# Patient Record
Sex: MALE | Race: WHITE | NOT HISPANIC OR LATINO | Employment: FULL TIME | URBAN - METROPOLITAN AREA
[De-identification: names, ages, dates, MRNs, and addresses within clinical notes are randomized per-mention and may not be internally consistent; named-entity substitution may affect disease eponyms.]

---

## 2023-01-21 ENCOUNTER — HOSPITAL ENCOUNTER (EMERGENCY)
Facility: HOSPITAL | Age: 43
Discharge: HOME | End: 2023-01-21
Attending: EMERGENCY MEDICINE

## 2023-01-21 ENCOUNTER — APPOINTMENT (EMERGENCY)
Dept: CT IMAGING | Facility: HOSPITAL | Age: 43
End: 2023-01-21

## 2023-01-21 VITALS
TEMPERATURE: 99.2 F | SYSTOLIC BLOOD PRESSURE: 123 MMHG | HEART RATE: 81 BPM | OXYGEN SATURATION: 97 % | DIASTOLIC BLOOD PRESSURE: 57 MMHG | RESPIRATION RATE: 16 BRPM

## 2023-01-21 DIAGNOSIS — M54.16 LUMBAR RADICULOPATHY: ICD-10-CM

## 2023-01-21 DIAGNOSIS — M54.50 ACUTE LOW BACK PAIN: Primary | ICD-10-CM

## 2023-01-21 DIAGNOSIS — M51.26 PROTRUSION OF LUMBAR INTERVERTEBRAL DISC: ICD-10-CM

## 2023-01-21 LAB
ANION GAP SERPL CALCULATED.3IONS-SCNC: 6 MMOL/L (ref 4–13)
BACTERIA UR QL AUTO: ABNORMAL /HPF
BASOPHILS # BLD AUTO: 0.02 THOUSANDS/ÂΜL (ref 0–0.1)
BASOPHILS NFR BLD AUTO: 0 % (ref 0–1)
BILIRUB UR QL STRIP: NEGATIVE
BUN SERPL-MCNC: 17 MG/DL (ref 5–25)
CALCIUM SERPL-MCNC: 9.4 MG/DL (ref 8.4–10.2)
CHLORIDE SERPL-SCNC: 109 MMOL/L (ref 96–108)
CLARITY UR: CLEAR
CO2 SERPL-SCNC: 25 MMOL/L (ref 21–32)
COLOR UR: YELLOW
CREAT SERPL-MCNC: 0.84 MG/DL (ref 0.6–1.3)
EOSINOPHIL # BLD AUTO: 0 THOUSAND/ÂΜL (ref 0–0.61)
EOSINOPHIL NFR BLD AUTO: 0 % (ref 0–6)
ERYTHROCYTE [DISTWIDTH] IN BLOOD BY AUTOMATED COUNT: 12.2 % (ref 11.6–15.1)
GFR SERPL CREATININE-BSD FRML MDRD: 108 ML/MIN/1.73SQ M
GLUCOSE SERPL-MCNC: 147 MG/DL (ref 65–140)
GLUCOSE UR STRIP-MCNC: NEGATIVE MG/DL
HCT VFR BLD AUTO: 40.3 % (ref 36.5–49.3)
HGB BLD-MCNC: 13.8 G/DL (ref 12–17)
HGB UR QL STRIP.AUTO: ABNORMAL
IMM GRANULOCYTES # BLD AUTO: 0.06 THOUSAND/UL (ref 0–0.2)
IMM GRANULOCYTES NFR BLD AUTO: 1 % (ref 0–2)
KETONES UR STRIP-MCNC: NEGATIVE MG/DL
LEUKOCYTE ESTERASE UR QL STRIP: NEGATIVE
LYMPHOCYTES # BLD AUTO: 0.7 THOUSANDS/ÂΜL (ref 0.6–4.47)
LYMPHOCYTES NFR BLD AUTO: 6 % (ref 14–44)
MCH RBC QN AUTO: 30.5 PG (ref 26.8–34.3)
MCHC RBC AUTO-ENTMCNC: 34.2 G/DL (ref 31.4–37.4)
MCV RBC AUTO: 89 FL (ref 82–98)
MONOCYTES # BLD AUTO: 0.06 THOUSAND/ÂΜL (ref 0.17–1.22)
MONOCYTES NFR BLD AUTO: 1 % (ref 4–12)
NEUTROPHILS # BLD AUTO: 10.36 THOUSANDS/ÂΜL (ref 1.85–7.62)
NEUTS SEG NFR BLD AUTO: 92 % (ref 43–75)
NITRITE UR QL STRIP: NEGATIVE
NON-SQ EPI CELLS URNS QL MICRO: ABNORMAL /HPF
NRBC BLD AUTO-RTO: 0 /100 WBCS
PH UR STRIP.AUTO: 8 [PH] (ref 4.5–8)
PLATELET # BLD AUTO: 230 THOUSANDS/UL (ref 149–390)
PMV BLD AUTO: 8.8 FL (ref 8.9–12.7)
POTASSIUM SERPL-SCNC: 4 MMOL/L (ref 3.5–5.3)
PROT UR STRIP-MCNC: NEGATIVE MG/DL
RBC # BLD AUTO: 4.53 MILLION/UL (ref 3.88–5.62)
RBC #/AREA URNS AUTO: ABNORMAL /HPF
SODIUM SERPL-SCNC: 140 MMOL/L (ref 135–147)
SP GR UR STRIP.AUTO: 1.02 (ref 1–1.03)
UROBILINOGEN UR QL STRIP.AUTO: 1 E.U./DL
WBC # BLD AUTO: 11.2 THOUSAND/UL (ref 4.31–10.16)
WBC #/AREA URNS AUTO: ABNORMAL /HPF

## 2023-01-21 RX ORDER — DIAZEPAM 5 MG/1
5 TABLET ORAL ONCE
Status: COMPLETED | OUTPATIENT
Start: 2023-01-21 | End: 2023-01-21

## 2023-01-21 RX ORDER — METHYLPREDNISOLONE SODIUM SUCCINATE 125 MG/2ML
60 INJECTION, POWDER, LYOPHILIZED, FOR SOLUTION INTRAMUSCULAR; INTRAVENOUS ONCE
Status: COMPLETED | OUTPATIENT
Start: 2023-01-21 | End: 2023-01-21

## 2023-01-21 RX ORDER — LIDOCAINE 50 MG/G
1 PATCH TOPICAL DAILY
Qty: 4 PATCH | Refills: 0 | Status: SHIPPED | OUTPATIENT
Start: 2023-01-21 | End: 2023-01-25

## 2023-01-21 RX ORDER — GABAPENTIN 100 MG/1
200 CAPSULE ORAL ONCE
Status: COMPLETED | OUTPATIENT
Start: 2023-01-21 | End: 2023-01-21

## 2023-01-21 RX ORDER — GABAPENTIN 100 MG/1
100 CAPSULE ORAL ONCE
Status: COMPLETED | OUTPATIENT
Start: 2023-01-21 | End: 2023-01-21

## 2023-01-21 RX ORDER — FENTANYL CITRATE 50 UG/ML
50 INJECTION, SOLUTION INTRAMUSCULAR; INTRAVENOUS ONCE
Status: COMPLETED | OUTPATIENT
Start: 2023-01-21 | End: 2023-01-21

## 2023-01-21 RX ORDER — METHYLPREDNISOLONE 4 MG/1
TABLET ORAL
Qty: 21 TABLET | Refills: 0 | Status: SHIPPED | OUTPATIENT
Start: 2023-01-21

## 2023-01-21 RX ORDER — KETOROLAC TROMETHAMINE 30 MG/ML
1 INJECTION, SOLUTION INTRAMUSCULAR; INTRAVENOUS ONCE
Status: COMPLETED | OUTPATIENT
Start: 2023-01-21 | End: 2023-01-21

## 2023-01-21 RX ORDER — LIDOCAINE 50 MG/G
1 PATCH TOPICAL ONCE
Status: DISCONTINUED | OUTPATIENT
Start: 2023-01-21 | End: 2023-01-21 | Stop reason: HOSPADM

## 2023-01-21 RX ORDER — OXYCODONE HYDROCHLORIDE AND ACETAMINOPHEN 5; 325 MG/1; MG/1
1 TABLET ORAL EVERY 6 HOURS PRN
Qty: 20 TABLET | Refills: 0 | Status: SHIPPED | OUTPATIENT
Start: 2023-01-21 | End: 2023-01-26

## 2023-01-21 RX ORDER — GABAPENTIN 300 MG/1
300 CAPSULE ORAL 3 TIMES DAILY
Qty: 21 CAPSULE | Refills: 0 | Status: SHIPPED | OUTPATIENT
Start: 2023-01-21 | End: 2023-01-28

## 2023-01-21 RX ORDER — OXYCODONE HCL 10 MG/1
10 TABLET, FILM COATED, EXTENDED RELEASE ORAL ONCE
Status: COMPLETED | OUTPATIENT
Start: 2023-01-21 | End: 2023-01-21

## 2023-01-21 RX ORDER — ACETAMINOPHEN 325 MG/1
650 TABLET ORAL ONCE
Status: COMPLETED | OUTPATIENT
Start: 2023-01-21 | End: 2023-01-21

## 2023-01-21 RX ORDER — DIAZEPAM 5 MG/1
5 TABLET ORAL EVERY 8 HOURS PRN
Qty: 21 TABLET | Refills: 0 | Status: SHIPPED | OUTPATIENT
Start: 2023-01-21 | End: 2023-01-28

## 2023-01-21 RX ADMIN — DIAZEPAM 5 MG: 5 TABLET ORAL at 20:25

## 2023-01-21 RX ADMIN — LIDOCAINE 1 PATCH: 50 PATCH TOPICAL at 17:56

## 2023-01-21 RX ADMIN — METHYLPREDNISOLONE SODIUM SUCCINATE 60 MG: 125 INJECTION, POWDER, FOR SOLUTION INTRAMUSCULAR; INTRAVENOUS at 19:33

## 2023-01-21 RX ADMIN — GABAPENTIN 200 MG: 100 CAPSULE ORAL at 20:25

## 2023-01-21 RX ADMIN — FENTANYL CITRATE 50 MCG: 50 INJECTION, SOLUTION INTRAMUSCULAR; INTRAVENOUS at 17:53

## 2023-01-21 RX ADMIN — SODIUM CHLORIDE 1000 ML: 0.9 INJECTION, SOLUTION INTRAVENOUS at 17:59

## 2023-01-21 RX ADMIN — ACETAMINOPHEN 650 MG: 325 TABLET ORAL at 19:32

## 2023-01-21 RX ADMIN — GABAPENTIN 100 MG: 100 CAPSULE ORAL at 17:55

## 2023-01-21 RX ADMIN — DIAZEPAM 5 MG: 5 TABLET ORAL at 17:55

## 2023-01-21 RX ADMIN — OXYCODONE HYDROCHLORIDE 10 MG: 10 TABLET, FILM COATED, EXTENDED RELEASE ORAL at 19:32

## 2023-01-21 NOTE — ED PROVIDER NOTES
History  Chief Complaint   Patient presents with   • Back Pain     Pt reports moving a "heavy damper" and felt his back go out  Pt now complaining of 7/10 back pain that radiates down his right leg  Pt  Has history of sciatica  Pt went to urgent care this afternoon and taking tylenol/motirn, and prednisone without relief  Patient is a 50-year-old male here with wife at bedside  Patient is otherwise healthy  He states that he has a history of low back issues that started several months ago  Does follow with a chiropractor and has been doing well  States that yesterday he went to put something he felt a pop in his back and he has had pain since  He has been alternating Tylenol Motrin since yesterday with no relief  He went to urgent center today and he was given prednisone and Flexeril with no relief  Patient without any fevers, chills, IV drug abuse  No dysuria urinary frequency, urinary retention  He denies any saddle anesthesia, loss of bowel or bladder  Denies any focal weakness throughout the bilateral lower extremities  He denies any focal seizures throughout the bilateral lower extremities  He points to the right side of his gluteal region and states that it starts there will have pain throughout the right leg  Never had surgery to his back  He did not slip and fall  History provided by:  Patient, spouse and EMS personnel   used: No    Back Pain  Location:  Lumbar spine and gluteal region  Quality:  Aching, burning, cramping and shooting  Radiates to: t/o right leg    Pain severity:  Moderate  Pain is:  Unable to specify  Onset quality:  Sudden  Timing:  Constant  Progression:  Worsening  Chronicity:  New  Context: physical stress    Context: not emotional stress, not falling, not jumping from heights, not lifting heavy objects, not MCA, not MVA, not occupational injury, not pedestrian accident, not recent illness, not recent injury and not twisting    Relieved by: Nothing  Worsened by: Movement  Ineffective treatments:  Being still, muscle relaxants, lying down, ibuprofen, OTC medications and NSAIDs  Associated symptoms: no abdominal pain, no abdominal swelling, no bladder incontinence, no bowel incontinence, no chest pain, no dysuria, no fever, no headaches, no leg pain, no numbness, no paresthesias, no pelvic pain, no perianal numbness, no tingling, no weakness and no weight loss    Risk factors: no hx of cancer, no hx of osteoporosis, no lack of exercise, not obese, no recent surgery, no steroid use and no vascular disease        None       Past Medical History:   Diagnosis Date   • Sciatica        History reviewed  No pertinent surgical history  History reviewed  No pertinent family history  I have reviewed and agree with the history as documented  E-Cigarette/Vaping     E-Cigarette/Vaping Substances     Social History     Tobacco Use   • Smoking status: Never   • Smokeless tobacco: Never   Substance Use Topics   • Alcohol use: Never   • Drug use: Never       Review of Systems   Constitutional: Negative  Negative for chills, fever and weight loss  HENT: Negative  Negative for ear pain and sore throat  Eyes: Negative  Negative for pain and visual disturbance  Respiratory: Negative  Negative for cough and shortness of breath  Cardiovascular: Negative  Negative for chest pain and palpitations  Gastrointestinal: Negative  Negative for abdominal pain, bowel incontinence and vomiting  Genitourinary: Negative  Negative for bladder incontinence, dysuria, hematuria and pelvic pain  Musculoskeletal: Positive for back pain  Negative for arthralgias  Skin: Negative  Negative for color change and rash  Neurological: Negative  Negative for tingling, seizures, syncope, weakness, numbness, headaches and paresthesias  Hematological: Negative  Psychiatric/Behavioral: Negative  All other systems reviewed and are negative        Physical Exam  Physical Exam  Vitals and nursing note reviewed  Constitutional:       General: He is not in acute distress  Appearance: He is well-developed  Comments: Patient appears uncomfortable   HENT:      Head: Normocephalic and atraumatic  Comments: Patient maintaining airway and secretions  No stridor   No brawniness under tongue  Mouth/Throat:      Mouth: Mucous membranes are moist    Eyes:      Extraocular Movements: Extraocular movements intact  Conjunctiva/sclera: Conjunctivae normal       Pupils: Pupils are equal, round, and reactive to light  Cardiovascular:      Rate and Rhythm: Normal rate and regular rhythm  Pulses:           Radial pulses are 2+ on the right side and 2+ on the left side  Dorsalis pedis pulses are 2+ on the right side and 2+ on the left side  Heart sounds: Normal heart sounds, S1 normal and S2 normal  No murmur heard  Pulmonary:      Effort: Pulmonary effort is normal  No respiratory distress  Breath sounds: Normal breath sounds  Abdominal:      General: Abdomen is protuberant  Bowel sounds are normal       Palpations: Abdomen is soft  Tenderness: There is no abdominal tenderness  There is no guarding or rebound  Musculoskeletal:         General: No swelling  Cervical back: Neck supple  Lumbar back: No tenderness  Negative right straight leg raise test and negative left straight leg raise test         Back:       Right lower leg: No edema  Left lower leg: No edema  Comments: Patient has full active into motion of the bilateral hips, knees, ankles and pain-free with manual muscle grade 5 out of 5  Skin:     General: Skin is warm and dry  Capillary Refill: Capillary refill takes less than 2 seconds  Neurological:      General: No focal deficit present  Mental Status: He is alert and oriented to person, place, and time     Psychiatric:         Mood and Affect: Mood normal          Behavior: Behavior normal          Thought Content:  Thought content normal          Judgment: Judgment normal          Vital Signs  ED Triage Vitals [01/21/23 1737]   Temperature Pulse Respirations Blood Pressure SpO2   99 2 °F (37 3 °C) 83 18 138/68 99 %      Temp Source Heart Rate Source Patient Position - Orthostatic VS BP Location FiO2 (%)   Oral Monitor Lying Left arm --      Pain Score       7           Vitals:    01/21/23 1737 01/21/23 2006   BP: 138/68 123/57   Pulse: 83 81   Patient Position - Orthostatic VS: Lying Lying         Visual Acuity      ED Medications  Medications   lidocaine (LIDODERM) 5 % patch 1 patch (1 patch Topical Medication Applied 1/21/23 1756)   ketorolac (FOR EMS ONLY) (TORADOL) injection 30 mg (0 mg Does not apply Given to EMS 1/21/23 1736)   sodium chloride 0 9 % bolus 1,000 mL (0 mL Intravenous Stopped 1/21/23 1933)   fentanyl citrate (PF) 100 MCG/2ML 50 mcg (50 mcg Intravenous Given 1/21/23 1753)   diazepam (VALIUM) tablet 5 mg (5 mg Oral Given 1/21/23 1755)   gabapentin (NEURONTIN) capsule 100 mg (100 mg Oral Given 1/21/23 1755)   methylPREDNISolone sodium succinate (Solu-MEDROL) injection 60 mg (60 mg Intravenous Given 1/21/23 1933)   acetaminophen (TYLENOL) tablet 650 mg (650 mg Oral Given 1/21/23 1932)   oxyCODONE (OxyCONTIN) 12 hr tablet 10 mg (10 mg Oral Given 1/21/23 1932)   gabapentin (NEURONTIN) capsule 200 mg (200 mg Oral Given 1/21/23 2025)   diazepam (VALIUM) tablet 5 mg (5 mg Oral Given 1/21/23 2025)       Diagnostic Studies  Results Reviewed     Procedure Component Value Units Date/Time    Urine Microscopic [[id number]]  (Abnormal) Collected: 01/21/23 1847    Lab Status: Final result Specimen: Urine, Clean Catch Updated: 01/21/23 1914     RBC, UA 1-2 /hpf      WBC, UA None Seen /hpf      Epithelial Cells None Seen /hpf      Bacteria, UA Occasional /hpf     Urine Macroscopic, POC [[id number]]  (Abnormal) Collected: 01/21/23 1847    Lab Status: Final result Specimen: Urine Updated: 01/21/23 1849     Color, UA Yellow     Clarity, UA Clear     pH, UA 8 0     Leukocytes, UA Negative     Nitrite, UA Negative     Protein, UA Negative mg/dl      Glucose, UA Negative mg/dl      Ketones, UA Negative mg/dl      Urobilinogen, UA 1 0 E U /dl      Bilirubin, UA Negative     Occult Blood, UA Trace     Specific [place], UA 1 025    Narrative:      CLINITEK RESULT    Basic metabolic panel [[id number]]  (Abnormal) Collected: 01/21/23 1800    Lab Status: Final result Specimen: Blood from Arm, Left Updated: 01/21/23 1834     Sodium 140 mmol/L      Potassium 4 0 mmol/L      Chloride 109 mmol/L      CO2 25 mmol/L      ANION GAP 6 mmol/L      BUN 17 mg/dL      Creatinine 0 84 mg/dL      Glucose 147 mg/dL      Calcium 9 4 mg/dL      eGFR 108 ml/min/1 73sq m     Narrative:      [place] guidelines for Chronic Kidney Disease (CKD):   •  Stage 1 with normal or high GFR (GFR > 90 mL/min/1 73 square meters)  •  Stage 2 Mild CKD (GFR = 60-89 mL/min/1 73 square meters)  •  Stage 3A Moderate CKD (GFR = 45-59 mL/min/1 73 square meters)  •  Stage 3B Moderate CKD (GFR = 30-44 mL/min/1 73 square meters)  •  Stage 4 Severe CKD (GFR = 15-29 mL/min/1 73 square meters)  •  Stage 5 End Stage CKD (GFR <15 mL/min/1 73 square meters)  Note: GFR calculation is accurate only with a steady state creatinine    CBC and differential [[id number]]  (Abnormal) Collected: 01/21/23 1800    Lab Status: Final result Specimen: Blood from Arm, Left Updated: 01/21/23 1819     WBC 11 20 Thousand/uL      RBC 4 53 Million/uL      Hemoglobin 13 8 g/dL      Hematocrit 40 3 %      MCV 89 fL      MCH 30 5 pg      MCHC 34 2 g/dL      RDW 12 2 %      MPV 8 8 fL      Platelets 229 Thousands/uL      nRBC 0 /100 WBCs      Neutrophils Relative 92 %      Immat GRANS % 1 %      Lymphocytes Relative 6 %      Monocytes Relative 1 %      Eosinophils Relative 0 %      Basophils Relative 0 %      Neutrophils Absolute 10 36 Thousands/µL      Immature Grans Absolute 0 06 Thousand/uL      Lymphocytes Absolute 0 70 Thousands/µL      Monocytes Absolute 0 06 Thousand/µL      Eosinophils Absolute 0 00 Thousand/µL      Basophils Absolute 0 02 Thousands/µL     Narrative: This is an appended report  These results have been appended to a previously verified report  CT spine lumbar without contrast   Final Result by Ranjan Freire MD (01/21 1942)   Right L5-S1 paracentral disc protrusion resulting in moderate right subarticular recess and impingement of descending right S1 nerve root  Correlate clinically for radicular symptoms  Recommend surgical evaluation  The study was marked in EPIC for significant notification  Workstation performed: [id number]                    Procedures  Procedures         ED Course  ED Course as of 01/21/23 2047   Sat Jan 21, 2023   1746 Patient is a 46-year-old male coming in today with back pain  On exam patient looks uncomfortable but neurologically intact  No focal deficits  Will obtain urine, PVR as well as obtain CT  Also check CBC BMP   Will give Lidoderm patch, Valium as well as pain medication    Disclosure: Voice to text software was used in the preparation of this document and could have resulted in translational errors       Occasional wrong word or "sound a like" substitutions may have occurred due to the inherent limitations of voice recognition software   Read the chart carefully and recognize, using context, where substitutions have occurred         1840 Since labs with mild leukocytosis otherwise no acute pathology or endorgan damage  Pending urine, PVR, CT   1851 PVR 56 cc  Urine with mild blood   1925 Patient states that the occasions helped for a little bit but the pain is increasing  Appears uncomfortable  1950 Will reach out to [facility] from [facility] for recommendations   1959 If he doesn't have neuro deficit, treat his pain   Give him stat Toradol, Medrol pack, muscle relaxer and follow up referral to OP pain Mx and PT - per [facility]  Will need MRI as well as outpatient  2012 Discussion with patient and wife  Patient states that his pain went from a 9 or 10 down to a 6  Discussed with him that unfortunately we cannot completely take with his pain away  He has had multiple doses of medications  He is aware of my discussion with neurosurgery as well  We will give another dose of Neurontin before he goes home  Discussed with him to stop the medications that were given to him from urgent center and start are new medications  Answered questions at bedside   2013    The management plan was discussed in detail with the patient at bedside and all questions were answered  [facility], we provided both verbal and written instructions   We discussed with the patient the signs and symptoms for which to return to the emergency department   All questions were answered and patient was comfortable with the plan of care and discharged to home   Instructed the patient to follow up with the primary care provider and/or specialist provided and their written instructions   The patient verbalized understanding of our discussion and plan of care, and agrees to return to the Emergency Department for concerns and progression of illness  Discussed with patient the importance of following up with their PCP                                 SBIRT 20yo+    Flowsheet Row Most Recent Value   SBIRT (25 yo +)    In order to provide better care to our patients, we are screening all of our patients for alcohol and drug use  Would it be okay to ask you these screening questions? Unable to answer at this time Filed at: 01/21/2023 3356                    Medical Decision Making  DDx including but not limited to: sciatica, herniated disc, arthritis, spinal stenosis, strain, sprain, fracture, cauda equina syndrome, epidural abscess, AAA         Amount and/or Complexity of Data Reviewed  Independent Historian: spouse Details: at bedside  Labs: ordered  Decision-making details documented in ED Course  Radiology: ordered  Decision-making details documented in ED Course  Risk  Prescription drug management  Disposition  Final diagnoses:   Acute low back pain   Protrusion of lumbar intervertebral disc   Lumbar radiculopathy     Time reflects when diagnosis was documented in both MDM as applicable and the Disposition within this note     Time User Action Codes Description Comment    1/21/2023  8:14 PM Florsamantha Bradshaw Add [M54 50] Acute low back pain     1/21/2023  8:14 PM Jeffrey Elam Add [M51 26] Protrusion of lumbar intervertebral disc     1/21/2023  8:14 PM Floreen Bradshaw Add [O14 47] Lumbar radiculopathy       ED Disposition     ED Disposition   Discharge    Condition   Stable    Date/Time   Sat Jan 21, 2023  8:21 PM    Comment   Kady Franks discharge to home/self care                 Follow-up Information     Follow up With Specialties Details Why Contact Info Additional [address] Neurosurgery Schedule an appointment as soon as possible for a visit in 3 days  [address] [address]  [address] [facility], [address], [address] [place], [place], [address] [address] Pain Medicine Schedule an appointment as soon as possible for a visit in 3 days  [facility] [phone]  [address], [address], [address] [place], [place], [address]   [address] [place] [address] Dr Schedule an appointment as soon as possible for a visit in 1 week  [address], [address] [facility] [address]  [address], [address], [address] [place], [place], [address] [phone]          Discharge Medication List as of 1/21/2023  8:21 PM      START taking these medications    Details   diazepam (VALIUM) 5 mg tablet Take 1 tablet (5 mg total) by mouth every 8 (eight) hours as needed for muscle spasms for up to 7 days, Starting Sat 1/21/2023, Until Sat 1/28/2023 at 2359, Normal      gabapentin (Neurontin) 300 mg capsule Take 1 capsule (300 mg total) by mouth 3 (three) times a day for 7 days For post-herpetic neuralgia:  Take 1 tablet on day 1,  Then take 2 tablets on day 2, Then take 3 tablets on day 3 and every day after that as instructed by your doctor , Starting Sat  1/21/2023, Until Sat 1/28/2023, Normal      lidocaine (LIDODERM) 5 % Apply 1 patch topically over 12 hours daily for 4 days Remove & Discard patch within 12 hours or as directed by MD, Starting Sat 1/21/2023, Until Wed 1/25/2023, Normal      methylPREDNISolone 4 MG tablet therapy pack Use as directed on package, Normal      oxyCODONE-acetaminophen (Percocet) 5-325 mg per tablet Take 1 tablet by mouth every 6 (six) hours as needed for moderate pain for up to 5 days Max Daily Amount: 4 tablets, Starting Sat 1/21/2023, Until Thu 1/26/2023 at 2359, Normal                 PDMP Review     None          ED Provider  Electronically Signed by           Johnathan Lundy DO  01/21/23 6561

## 2023-01-21 NOTE — Clinical Note
Cresencio Schaffer was seen and treated in our emergency department on 1/21/2023  Diagnosis:     Ciera Mtz    He may return on this date: 01/25/2023         If you have any questions or concerns, please don't hesitate to call        Maykel Juarez DO    ______________________________           _______________          _______________  Hospital Representative                              Date                                Time

## 2023-01-22 NOTE — DISCHARGE INSTRUCTIONS
While taking Percocet, do not take Tylenol as this contains Tylenol  While taking Percocet start taking stool softeners to help prevent constipation  Please call pain management, neurosurgery and physical therapy next week for close follow-up  If you develop any persistent worsening pain, weakness, or any new symptoms please return to the ER or call [phone]

## 2023-01-22 NOTE — QUICK NOTE
I was contacted by Hakeem Brannon regarding a 40years old man who presented with right lumbar spine pain that started 1 day prior and worsening into today  He has some back problems in the past, going chiropractor yesterday was pushing something heavy  And hurt himself  He was taking Tylenol and Motrin, it did not help him so he went to urgent care was given prednisone and Flexeril       Exam: Per provider  neurologically nonfocal, except tenderness in the right lumbar spine into performance with pain extending down the right leg to the calf, feeling uncomfortable  CT shows right L5-S1 paracentral disc protrusion resulting in moderate right subarticular recess and impingement of the descending right S1 nerve root  Recommendations:  1  Control pain including steroid, muscle relaxer, gabapentin or Lyrica anti-inflammatory medication  2  Outpatient follow-up with pain management for possible RABIA and physical therapy  3  If pain persists and starts weakness numbness and paresthesia in the extremities, can call office and set up an appointment immediately  4  Fall precaution, avoid lifting heavy objects, excessive bending or twisting  5  Call with question or concern  About 20 minutes was spent reviewing history, physical examination and  Images  50% of the time was spent discussing management plan and coordination of care  Provider in agreement with plan of care